# Patient Record
Sex: FEMALE | Race: ASIAN | NOT HISPANIC OR LATINO | Employment: OTHER | URBAN - METROPOLITAN AREA
[De-identification: names, ages, dates, MRNs, and addresses within clinical notes are randomized per-mention and may not be internally consistent; named-entity substitution may affect disease eponyms.]

---

## 2017-05-13 ENCOUNTER — TELEPHONE (OUTPATIENT)
Dept: INTERNAL MEDICINE | Facility: CLINIC | Age: 62
End: 2017-05-13

## 2017-09-17 ENCOUNTER — HEALTH MAINTENANCE LETTER (OUTPATIENT)
Age: 62
End: 2017-09-17

## 2024-05-27 ENCOUNTER — OFFICE VISIT (OUTPATIENT)
Dept: URGENT CARE | Facility: URGENT CARE | Age: 69
End: 2024-05-27
Payer: COMMERCIAL

## 2024-05-27 VITALS
BODY MASS INDEX: 30.86 KG/M2 | SYSTOLIC BLOOD PRESSURE: 164 MMHG | OXYGEN SATURATION: 97 % | HEART RATE: 81 BPM | TEMPERATURE: 97.2 F | WEIGHT: 172 LBS | DIASTOLIC BLOOD PRESSURE: 80 MMHG | RESPIRATION RATE: 16 BRPM

## 2024-05-27 DIAGNOSIS — H57.89 EYE DRAINAGE: ICD-10-CM

## 2024-05-27 DIAGNOSIS — I10 HYPERTENSION, UNSPECIFIED TYPE: ICD-10-CM

## 2024-05-27 DIAGNOSIS — H04.301 DACROCYSTITIS, RIGHT: Primary | ICD-10-CM

## 2024-05-27 PROCEDURE — 99203 OFFICE O/P NEW LOW 30 MIN: CPT | Performed by: PHYSICIAN ASSISTANT

## 2024-05-27 RX ORDER — OFLOXACIN 3 MG/ML
3 SOLUTION/ DROPS OPHTHALMIC 4 TIMES DAILY
Qty: 5 ML | Refills: 0 | Status: SHIPPED | OUTPATIENT
Start: 2024-05-27 | End: 2024-06-03

## 2024-05-27 RX ORDER — CEPHALEXIN 500 MG/1
500 CAPSULE ORAL 3 TIMES DAILY
Qty: 21 CAPSULE | Refills: 0 | Status: SHIPPED | OUTPATIENT
Start: 2024-05-27 | End: 2024-06-03

## 2024-05-27 NOTE — PROGRESS NOTES
Assessment & Plan     Dacrocystitis, right    Warm moist compresses  Left inner gland is swollen and tender  Start on oral antiibiotics  - cephALEXin (KEFLEX) 500 MG capsule; Take 1 capsule (500 mg) by mouth 3 times daily for 7 days    Eye drainage    Secondary to dacrocystitis  - ofloxacin (OCUFLOX) 0.3 % ophthalmic solution; Place 3 drops into the right eye 4 times daily for 7 days    Hypertension, unspecified type    Patient advised to follow up with PCP for recheck blood pressure   Information given to patient on diet modifications, including lowering salt in diet, decrease calories, weight loss and exercise.  Monitor blood pressure at home and if BP maintains over 140/90 then advise having a recheck with PCP in 2 weeks.       At today's visit with Analilia Ross , we discussed results, diagnosis, medications and formulated a plan.  We also discussed red flags for immediate return to clinic/ER, as well as indications for follow up with PCP if not improved in 3 days. Patient understood and agreed to plan. Analilia Ross was discharged with stable vitals and has no further questions.       No follow-ups on file.    Subjective   Analilia is a 69 year old, presenting for the following health issues:  Urgent Care and Conjunctivitis (redness, discharge and teary eyes in bilateral eyes  worse on Right eye onset 3 days ago )    HPI     Review of Systems  Constitutional, HEENT, cardiovascular, pulmonary, gi and gu systems are negative, except as otherwise noted.      Objective    BP (!) 164/80 (BP Location: Right arm, Patient Position: Sitting, Cuff Size: Adult Large)   Pulse 81   Temp 97.2  F (36.2  C) (Tympanic)   Resp 16   Wt 78 kg (172 lb)   SpO2 97%   Breastfeeding No   BMI 30.86 kg/m    Body mass index is 30.86 kg/m .  Physical Exam   GENERAL: alert and no distress  EYES: pos for right eye drainage  HENT: ear canals and TM's normal, nose and mouth without ulcers or lesions  NECK: no adenopathy, no  asymmetry, masses, or scars  MS: no gross musculoskeletal defects noted, no edema  SKIN: pos for swelling medial part of eye in duct area  NEURO: Normal strength and tone, mentation intact and speech normal  PSYCH: mentation appears normal, affect normal/bright            Signed Electronically by: MARGOT Sarmiento, PAAlanC

## 2024-07-08 ENCOUNTER — OFFICE VISIT (OUTPATIENT)
Dept: URGENT CARE | Facility: URGENT CARE | Age: 69
End: 2024-07-08
Payer: COMMERCIAL

## 2024-07-08 ENCOUNTER — ANCILLARY PROCEDURE (OUTPATIENT)
Dept: GENERAL RADIOLOGY | Facility: CLINIC | Age: 69
End: 2024-07-08
Attending: PHYSICIAN ASSISTANT
Payer: COMMERCIAL

## 2024-07-08 VITALS
TEMPERATURE: 97.3 F | BODY MASS INDEX: 30.86 KG/M2 | RESPIRATION RATE: 16 BRPM | SYSTOLIC BLOOD PRESSURE: 132 MMHG | DIASTOLIC BLOOD PRESSURE: 78 MMHG | HEART RATE: 83 BPM | WEIGHT: 172 LBS | OXYGEN SATURATION: 98 %

## 2024-07-08 DIAGNOSIS — S89.91XA LEG INJURY, RIGHT, INITIAL ENCOUNTER: ICD-10-CM

## 2024-07-08 DIAGNOSIS — M79.89 LEG SWELLING: ICD-10-CM
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
## 2024-07-08 DIAGNOSIS — S80.12XA MULTIPLE LEG CONTUSIONS, LEFT, INITIAL ENCOUNTER: ICD-10-CM

## 2024-07-08 PROCEDURE — 73590 X-RAY EXAM OF LOWER LEG: CPT | Mod: TC | Performed by: RADIOLOGY

## 2024-07-08 PROCEDURE — 99214 OFFICE O/P EST MOD 30 MIN: CPT | Performed by: PHYSICIAN ASSISTANT

## 2024-07-08 RX ORDER — ONDANSETRON 4 MG/1
1 TABLET, FILM COATED ORAL EVERY 6 HOURS PRN
COMMUNITY
Start: 2022-10-12

## 2024-07-08 RX ORDER — CELECOXIB 200 MG/1
200 CAPSULE ORAL DAILY
Qty: 14 CAPSULE | Refills: 0 | Status: SHIPPED | OUTPATIENT
Start: 2024-07-08

## 2024-07-08 RX ORDER — METFORMIN HCL 500 MG
500 TABLET, EXTENDED RELEASE 24 HR ORAL
COMMUNITY
Start: 2023-09-27

## 2024-07-08 RX ORDER — TIZANIDINE 2 MG/1
2 TABLET ORAL
COMMUNITY
Start: 2024-05-30

## 2024-07-08 RX ORDER — PREGABALIN 25 MG/1
25 CAPSULE ORAL
COMMUNITY
Start: 2024-04-26 | End: 2025-04-26

## 2024-07-08 NOTE — PROGRESS NOTES
Assessment & Plan     Fall, initial encounter    Patient had a fall yesterday  Localized swelling and inflammation left anterior lateral leg    Leg injury, right, initial encounter    Xray tibia/fibula Negative for acute findings, read by Jose Ramon FROST at time of visit.    RICE treatment: Rest, Ice, compression, elevation   DME compression stockings  Start on celebrex for inflammation  May use OTC tenol  - XR Tibia and Fibula Left 2 Views  - celecoxib (CELEBREX) 200 MG capsule  Dispense: 14 capsule; Refill: 0    Multiple leg contusions, left, initial encounter    Contusion is the medical term for a bruise. It is the result of a direct blow or an impact, such as a fall. Contusions are common sports injuries.  Most people think of a bruise as a black-and-blue spot. This happens when small blood vessels get torn and leak blood under the skin. But bones, muscles, and organs can also get bruised. This may damage deep tissues but not cause a bruise you can see.    - celecoxib (CELEBREX) 200 MG capsule  Dispense: 14 capsule; Refill: 0  - Compression Sleeve/Stocking Order for DME - ONLY FOR DME    Leg swelling    Ice compresses  Compression stocking  elevation  - celecoxib (CELEBREX) 200 MG capsule  Dispense: 14 capsule; Refill: 0  - Compression Sleeve/Stocking Order for DME - ONLY FOR DME         At today's visit with Analilia Ross , we discussed results, diagnosis, medications and formulated a plan.  We also discussed red flags for immediate return to clinic/ER, as well as indications for follow up with PCP if not improved in 3 days. Patient understood and agreed to plan. Analilia Ross was discharged with stable vitals and has no further questions.       No follow-ups on file.    MARGOT Sarmiento, PATWYLA  [facility] CenterMARLENE Billingsley is a 69 year old female who presents to clinic today for the following health issues:  Chief Complaint   Patient presents with    Knee Injury      PT fell on L knee and now it is swollen, painful, and hard to walk/bend        HPI  Review of Systems  Constitutional, HEENT, cardiovascular, pulmonary, gi and gu systems are negative, except as otherwise noted.      Objective    /78   Pulse 83   Temp 97.3  F (36.3  C) (Tympanic)   Resp 16   Wt 78 kg (172 lb)   SpO2 98%   BMI 30.86 kg/m    Physical Exam   GENERAL: alert and no distress  MS: pos for left anterior lateral leg localized swelling  SKIN: pos for left anterior lateral leg swelling, localized swelling  NEURO: Normal strength and tone, mentation intact and speech normal  PSYCH: mentation appears normal, affect normal/bright      Tibia xray Negative for acute findings, read by Jose Ramon FROST at time of visit.